# Patient Record
Sex: FEMALE | Race: WHITE | NOT HISPANIC OR LATINO
[De-identification: names, ages, dates, MRNs, and addresses within clinical notes are randomized per-mention and may not be internally consistent; named-entity substitution may affect disease eponyms.]

---

## 2017-11-08 ENCOUNTER — RESULT REVIEW (OUTPATIENT)
Age: 37
End: 2017-11-08

## 2017-12-26 ENCOUNTER — TRANSCRIPTION ENCOUNTER (OUTPATIENT)
Age: 37
End: 2017-12-26

## 2019-03-11 ENCOUNTER — RX RENEWAL (OUTPATIENT)
Age: 39
End: 2019-03-11

## 2019-03-25 ENCOUNTER — RECORD ABSTRACTING (OUTPATIENT)
Age: 39
End: 2019-03-25

## 2019-03-25 DIAGNOSIS — Z82.61 FAMILY HISTORY OF ARTHRITIS: ICD-10-CM

## 2019-03-25 DIAGNOSIS — Z82.49 FAMILY HISTORY OF ISCHEMIC HEART DISEASE AND OTHER DISEASES OF THE CIRCULATORY SYSTEM: ICD-10-CM

## 2019-03-25 DIAGNOSIS — Z82.0 FAMILY HISTORY OF EPILEPSY AND OTHER DISEASES OF THE NERVOUS SYSTEM: ICD-10-CM

## 2019-03-25 DIAGNOSIS — Z80.52 FAMILY HISTORY OF MALIGNANT NEOPLASM OF BLADDER: ICD-10-CM

## 2019-03-25 DIAGNOSIS — Z83.3 FAMILY HISTORY OF DIABETES MELLITUS: ICD-10-CM

## 2019-03-25 DIAGNOSIS — Z86.59 PERSONAL HISTORY OF OTHER MENTAL AND BEHAVIORAL DISORDERS: ICD-10-CM

## 2019-04-01 ENCOUNTER — RX RENEWAL (OUTPATIENT)
Age: 39
End: 2019-04-01

## 2019-04-04 RX ORDER — FLUOXETINE HYDROCHLORIDE 20 MG/1
20 CAPSULE ORAL
Qty: 90 | Refills: 3 | Status: ACTIVE | COMMUNITY
Start: 2019-03-11

## 2019-06-06 ENCOUNTER — APPOINTMENT (OUTPATIENT)
Dept: OBGYN | Facility: CLINIC | Age: 39
End: 2019-06-06
Payer: COMMERCIAL

## 2019-06-06 VITALS
SYSTOLIC BLOOD PRESSURE: 118 MMHG | HEIGHT: 65 IN | DIASTOLIC BLOOD PRESSURE: 60 MMHG | WEIGHT: 183 LBS | BODY MASS INDEX: 30.49 KG/M2

## 2019-06-06 DIAGNOSIS — N60.19 DIFFUSE CYSTIC MASTOPATHY OF UNSPECIFIED BREAST: ICD-10-CM

## 2019-06-06 PROCEDURE — 99395 PREV VISIT EST AGE 18-39: CPT

## 2019-06-06 PROCEDURE — 36415 COLL VENOUS BLD VENIPUNCTURE: CPT

## 2019-06-07 ENCOUNTER — TRANSCRIPTION ENCOUNTER (OUTPATIENT)
Age: 39
End: 2019-06-07

## 2019-06-12 LAB
C TRACH RRNA SPEC QL NAA+PROBE: NOT DETECTED
CYTOLOGY CVX/VAG DOC THIN PREP: NORMAL
HCV AB SER QL: NONREACTIVE
HCV S/CO RATIO: 0.14 S/CO
HIV1+2 AB SPEC QL IA.RAPID: NONREACTIVE
HPV HIGH+LOW RISK DNA PNL CVX: NOT DETECTED
N GONORRHOEA RRNA SPEC QL NAA+PROBE: NOT DETECTED
SOURCE AMPLIFICATION: NORMAL
T PALLIDUM AB SER QL IA: NEGATIVE

## 2019-07-15 ENCOUNTER — RESULT REVIEW (OUTPATIENT)
Age: 39
End: 2019-07-15

## 2020-04-26 ENCOUNTER — MESSAGE (OUTPATIENT)
Age: 40
End: 2020-04-26

## 2020-05-06 LAB
SARS-COV-2 IGG SERPL IA-ACNC: 3.7 AU/ML
SARS-COV-2 IGG SERPL QL IA: NEGATIVE

## 2020-08-10 ENCOUNTER — APPOINTMENT (OUTPATIENT)
Dept: OBGYN | Facility: CLINIC | Age: 40
End: 2020-08-10
Payer: COMMERCIAL

## 2020-08-10 VITALS — DIASTOLIC BLOOD PRESSURE: 74 MMHG | SYSTOLIC BLOOD PRESSURE: 120 MMHG | HEIGHT: 65 IN | TEMPERATURE: 98.7 F

## 2020-08-10 PROCEDURE — 99396 PREV VISIT EST AGE 40-64: CPT

## 2020-08-10 RX ORDER — FLUOXETINE HYDROCHLORIDE 20 MG/1
20 CAPSULE ORAL
Refills: 0 | Status: DISCONTINUED | COMMUNITY
End: 2020-08-10

## 2020-08-10 RX ORDER — MEDROXYPROGESTERONE ACETATE 150 MG/ML
INJECTION, SUSPENSION INTRAMUSCULAR
Refills: 0 | Status: DISCONTINUED | COMMUNITY
End: 2020-08-10

## 2020-08-10 RX ORDER — NORETHINDRONE ACETATE AND ETHINYL ESTRADIOL 1.5; .03 MG/1; MG/1
1.5-3 TABLET, FILM COATED ORAL
Refills: 0 | Status: DISCONTINUED | COMMUNITY
End: 2020-08-10

## 2020-08-10 RX ORDER — NORETHINDRONE ACETATE/ETHINYL ESTRADIOL 1.5-0.03MG
1.5-3 KIT ORAL
Refills: 0 | Status: DISCONTINUED | COMMUNITY
End: 2020-08-10

## 2020-08-21 ENCOUNTER — RESULT REVIEW (OUTPATIENT)
Age: 40
End: 2020-08-21

## 2021-08-26 ENCOUNTER — APPOINTMENT (OUTPATIENT)
Dept: OBGYN | Facility: CLINIC | Age: 41
End: 2021-08-26
Payer: COMMERCIAL

## 2021-08-26 ENCOUNTER — NON-APPOINTMENT (OUTPATIENT)
Age: 41
End: 2021-08-26

## 2021-08-26 VITALS
BODY MASS INDEX: 32.65 KG/M2 | SYSTOLIC BLOOD PRESSURE: 118 MMHG | HEIGHT: 65 IN | DIASTOLIC BLOOD PRESSURE: 76 MMHG | WEIGHT: 196 LBS

## 2021-08-26 PROCEDURE — 99396 PREV VISIT EST AGE 40-64: CPT

## 2021-08-26 NOTE — HISTORY OF PRESENT ILLNESS
[Mammogramdate] : 8/21/20 [TextBox_19] : BIRADS 1 [BreastSonogramDate] : 8/21/20 [TextBox_25] : BIRADS 1 [PapSmeardate] : 6/6/19 [TextBox_31] : Neg/HPV Neg [PGHxTotal] : 2 [[facility] Instit[facility]exFullTerm] : 2 [PGHxPremature] : 0 [PGHxAbortions] : 0 [[facility]Living] : 2 [FreeTextEntry1] :  Pregnancy 1:  2009, normal spontaneous vaginal delivery (), Female "Radha", Birth Weight:7lb 15oz.  Pregnancy 2:  2014, normal spontaneous vaginal delivery (), Male "Gagan", Birth Weight:9lbs 1oz

## 2021-09-29 ENCOUNTER — RESULT REVIEW (OUTPATIENT)
Age: 41
End: 2021-09-29

## 2022-01-27 ENCOUNTER — RESULT REVIEW (OUTPATIENT)
Age: 42
End: 2022-01-27

## 2022-09-13 ENCOUNTER — RESULT REVIEW (OUTPATIENT)
Age: 42
End: 2022-09-13

## 2023-01-10 ENCOUNTER — APPOINTMENT (OUTPATIENT)
Dept: OBGYN | Facility: CLINIC | Age: 43
End: 2023-01-10
Payer: COMMERCIAL

## 2023-01-10 VITALS
DIASTOLIC BLOOD PRESSURE: 80 MMHG | BODY MASS INDEX: 31.49 KG/M2 | HEIGHT: 65 IN | SYSTOLIC BLOOD PRESSURE: 110 MMHG | WEIGHT: 189 LBS

## 2023-01-10 DIAGNOSIS — Z12.4 ENCOUNTER FOR SCREENING FOR MALIGNANT NEOPLASM OF CERVIX: ICD-10-CM

## 2023-01-10 DIAGNOSIS — R92.2 INCONCLUSIVE MAMMOGRAM: ICD-10-CM

## 2023-01-10 DIAGNOSIS — Z00.00 ENCOUNTER FOR GENERAL ADULT MEDICAL EXAMINATION W/OUT ABNORMAL FINDINGS: ICD-10-CM

## 2023-01-10 DIAGNOSIS — Z11.3 ENCOUNTER FOR SCREENING FOR INFECTIONS WITH A PREDOMINANTLY SEXUAL MODE OF TRANSMISSION: ICD-10-CM

## 2023-01-10 DIAGNOSIS — Z92.89 PERSONAL HISTORY OF OTHER MEDICAL TREATMENT: ICD-10-CM

## 2023-01-10 DIAGNOSIS — Z11.51 ENCOUNTER FOR SCREENING FOR HUMAN PAPILLOMAVIRUS (HPV): ICD-10-CM

## 2023-01-10 DIAGNOSIS — R87.810 CERVICAL HIGH RISK HUMAN PAPILLOMAVIRUS (HPV) DNA TEST POSITIVE: ICD-10-CM

## 2023-01-10 PROCEDURE — 99396 PREV VISIT EST AGE 40-64: CPT

## 2023-01-10 RX ORDER — ALPRAZOLAM 2 MG/1
TABLET ORAL
Refills: 0 | Status: ACTIVE | COMMUNITY

## 2023-01-10 RX ORDER — MOMETASONE FUROATE MONOHYDRATE 50 UG/1
50 SPRAY, METERED NASAL
Refills: 0 | Status: DISCONTINUED | COMMUNITY
End: 2023-01-10

## 2023-01-10 NOTE — HISTORY OF PRESENT ILLNESS
[FreeTextEntry1] : 41 y/o  presents for annual GYN exam.\par \par  and sexually active without and issues. Pt is S/P TL.\par \par Periods are monthly and very light since endometrial ablation approximately 6-7 years ago.\par \par She reports overall good health. 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

## 2023-01-12 LAB — HPV HIGH+LOW RISK DNA PNL CVX: NOT DETECTED

## 2023-01-17 ENCOUNTER — TRANSCRIPTION ENCOUNTER (OUTPATIENT)
Age: 43
End: 2023-01-17

## 2023-01-17 LAB — CYTOLOGY CVX/VAG DOC THIN PREP: NORMAL

## 2023-03-08 ENCOUNTER — APPOINTMENT (OUTPATIENT)
Dept: PAIN MANAGEMENT | Facility: CLINIC | Age: 43
End: 2023-03-08
Payer: COMMERCIAL

## 2023-03-08 VITALS
SYSTOLIC BLOOD PRESSURE: 126 MMHG | BODY MASS INDEX: 31.49 KG/M2 | HEIGHT: 65 IN | TEMPERATURE: 98 F | WEIGHT: 189 LBS | DIASTOLIC BLOOD PRESSURE: 86 MMHG

## 2023-03-08 PROCEDURE — 99204 OFFICE O/P NEW MOD 45 MIN: CPT

## 2023-03-08 RX ORDER — BUPROPION HYDROCHLORIDE 100 MG/1
TABLET, FILM COATED ORAL
Refills: 0 | Status: ACTIVE | COMMUNITY

## 2023-03-08 NOTE — HISTORY OF PRESENT ILLNESS
[0 (No Pain)] : 1. What number best describes your pain on average in the past week? 0/10 pain [Neck Pain] : neck pain [___ yrs] : [unfilled] year(s) ago [Paroxysmal] : paroxysmal [1] : a current pain level of 1/10 [0] : a minimum pain level of 0/10 [10] : a maximum pain level of 10/10 [Sharp] : sharp [Dull] : dull [Aching] : aching [Burning] : burning [Shooting] : shooting [Lifting] : lifting [Heat] : heat [FreeTextEntry1] : HPI\par \par  \par \par Ms. VILMA RANDLE is a 42 year F with no significant pmhx present with 10 yr hx neck pain that radiates down the arms at times. Recent exacerbation of pain that radiated down the right arm with associated pin and needles to hands.  Denies weakness or fine motor difficulties. No relief with muscle relaxer and Tylenol. Pain has impacted ability to perform ADL's. Denies any additional weakness, numbness, bowel/bladder dysfunction.\par \par \par Previous and current pain medications/doses/effects: Flexeril, Tylenol \par \par  \par \par Previous Pain Treatments: PT \par \par  \par \par Previous Pain Injections: None\par \par  \par \par Previous Diagnostic Studies/Images: None\par \par  \par \par \par  [FreeTextEntry2] : 2 [FreeTextEntry7] : Neck pain , radiates to right arm [FreeTextEntry3] : any arm movement  [FreeTextEntry4] : chiropractor, tylenol

## 2023-03-08 NOTE — ASSESSMENT
[FreeTextEntry1] : >> Imaging and Other Studies\par \par Neck and arm pain likely secondary to cervical radiculopathy and discogenic pain vs spondylosis refractory to conservative treatments including home exercises, will obtain MRI CS to evaluate for pathology\par \par may consider PT vs intervention pending eval\par \par >> Therapy and Other Modalities\par \par consider PT\par \par >> Medications\par \par acetaminophen 650mg q8h prn pain (caution <3g daily)\par  \par >> Interventions\par \par na\par \par >> Consults\par \par na\par \par >> Discussion of Risks/Benefits/Alternatives\par \par 	>Regarding any scheduled procedures:\par \par I have discussed in detail with the patient that any interventional pain procedure is associated with potential risks.  The procedure may include an injection of steroids and potentially other medications (local anesthetic and normal saline) into the epidural space or surrounding tissue of the spine.  There are significant risks of this procedure which include and are not limited to infection, bleeding, worsening pain, dural puncture leading to postdural puncture headache, nerve damage, spinal cord injury, paralysis, stroke, and death.  \par \par There is a chance that the procedure does not improve their pain.  \par \par There are risks associated with the steroid being absorbed into the body systemically.  These include dysphoria, difficulty sleeping, mood swings and personality changes.  Premenopausal women may notice an irregularity in her menstrual cycle for 2-3 months following the injection.  Steroids can specifically affect patients with hypertension, diabetes, and peptic ulcers.  The procedure may cause a temporary increase in blood pressure and blood pressure, and may adversely affect a peptic ulcer.  Other, more rare complications, include avascular necrosis of joints, glaucoma and worsening of osteoporosis. \par \par I have discussed the risks of the procedure at length with the patient, and the potential benefits of pain relief.  I have offered alternatives to the procedure.  All questions were answered.  \par \par The patient expressed understanding and wishes to proceed with the procedure.\par \par 	>Regarding COVID19 Pandemic: \par \par Any planned interventional pain procedure are scheduled because further delay may cause harm or negative outcome to patient.  The goal in performing this procedure is to avoid deterioration of function, emergency room visits (which increases exposure) and reliance on opioids.  \par \par r/b/a discussed with patient, lack of evidence to conclusively determine whether pain management procedures have any positive or negative impact on the possibility of hima the virus and/or development of any sequelae. \par \par Patient counselled regarding timing steroid based intervention 2 weeks before or after COVID-19 vaccine administration to avoid any interaction or affect on efficacy of vaccination\par \par Patient demonstrates understanding\par \par Informed patient that risks associated with the COVID-19 infection.  Informed patient steps taken to limit the risks.  We are implementing safety precautions and following protocols consistent with the CDC and state recommendations. All patients and staff will be checked for fever or signs of illness upon entry to the facility. We will limit our steroid dose to the lowest effective therapeutic dose or in some cases steroids will not be injected at all. \par \par Patient agrees to proceed\par \par >> Conclusion\par \par The above diagnosis and treatment plan is medically reasonable and necessary based on the patient encounter \par There were no barriers to communication.\par Informed patient that I would be available for any additional questions.\par Patient was instructed to call with any worsening symptoms including severe pain, new numbness/weakness, or changes in the bowel/bladder function. \par Discussed role of nsaids in pain management and all relevant risks, if patient is continuing to require after 4 weeks the patient should f/u for alternative treatment. \par Instructed patient to maintain pain diary to monitor pain level, mobility, and function.\par \par The referring provider was informed of the above diagnosis and treatment plan.\par

## 2023-03-08 NOTE — REVIEW OF SYSTEMS
[Neck Pain] : neck pain [Radiating Pain] : radiating pain [Numbness] : numbness [Negative] : Heme/Lymph

## 2023-03-08 NOTE — PHYSICAL EXAM
[de-identified] : Constitutional: Normal, well developed, no acute distress\par Eyes: Symmetric, External structures \par Oropharynx: Lips normal, symmetric, no external lesions appreciated\par Respiratory: Non-labored breathing, no audible wheezes\par Cardiac: Pulse palpated, no tachycardia\par Vascular: No cyanosis appreciated, no edema in bilateral lower extremities\par GI: Nondistended, no jaundice appreciated\par Neurovascular: CN2-12 grossly intact, Alert and oriented\par MSK: Normal muscle bulk, 5/5 Motor strength B/L in LE\par \par

## 2023-03-17 ENCOUNTER — RESULT REVIEW (OUTPATIENT)
Age: 43
End: 2023-03-17

## 2023-03-22 ENCOUNTER — APPOINTMENT (OUTPATIENT)
Dept: PAIN MANAGEMENT | Facility: CLINIC | Age: 43
End: 2023-03-22
Payer: COMMERCIAL

## 2023-03-22 VITALS
SYSTOLIC BLOOD PRESSURE: 125 MMHG | HEIGHT: 65 IN | BODY MASS INDEX: 31.49 KG/M2 | WEIGHT: 189 LBS | DIASTOLIC BLOOD PRESSURE: 89 MMHG | TEMPERATURE: 98 F

## 2023-03-22 PROCEDURE — 99214 OFFICE O/P EST MOD 30 MIN: CPT

## 2023-03-22 NOTE — ASSESSMENT
[FreeTextEntry1] : >> Imaging and Other Studies\par \par I personally reviewed the relevant imaging.  Discussed and explained to patient the likely source of pathology and pain.  Questions answered. MRI\par \par >> Therapy and Other Modalities\par \par consider PT\par \par >> Medications\par \par acetaminophen 650mg q8h prn pain (caution <3g daily)\par  \par >> Interventions\par \par \par MRI demonstrating disc herniation causing radiculopathy, significantly impactful to ability to perform ADL and refractory to conservative treatments, including physician directed exercises/PT.  Will schedule C7-T1 interlaminar epidural steroid injection r/b/a discussed\par \par >> Consults\par \par na\par \par >> Discussion of Risks/Benefits/Alternatives\par \par 	>Regarding any scheduled procedures:\par \par I have discussed in detail with the patient that any interventional pain procedure is associated with potential risks.  The procedure may include an injection of steroids and potentially other medications (local anesthetic and normal saline) into the epidural space or surrounding tissue of the spine.  There are significant risks of this procedure which include and are not limited to infection, bleeding, worsening pain, dural puncture leading to postdural puncture headache, nerve damage, spinal cord injury, paralysis, stroke, and death.  \par \par There is a chance that the procedure does not improve their pain.  \par \par There are risks associated with the steroid being absorbed into the body systemically.  These include dysphoria, difficulty sleeping, mood swings and personality changes.  Premenopausal women may notice an irregularity in her menstrual cycle for 2-3 months following the injection.  Steroids can specifically affect patients with hypertension, diabetes, and peptic ulcers.  The procedure may cause a temporary increase in blood pressure and blood pressure, and may adversely affect a peptic ulcer.  Other, more rare complications, include avascular necrosis of joints, glaucoma and worsening of osteoporosis. \par \par I have discussed the risks of the procedure at length with the patient, and the potential benefits of pain relief.  I have offered alternatives to the procedure.  All questions were answered.  \par \par The patient expressed understanding and wishes to proceed with the procedure.\par \par 	>Regarding COVID19 Pandemic: \par \par Any planned interventional pain procedure are scheduled because further delay may cause harm or negative outcome to patient.  The goal in performing this procedure is to avoid deterioration of function, emergency room visits (which increases exposure) and reliance on opioids.  \par \par r/b/a discussed with patient, lack of evidence to conclusively determine whether pain management procedures have any positive or negative impact on the possibility of hima the virus and/or development of any sequelae. \par \par Patient counselled regarding timing steroid based intervention 2 weeks before or after COVID-19 vaccine administration to avoid any interaction or affect on efficacy of vaccination\par \par Patient demonstrates understanding\par \par Informed patient that risks associated with the COVID-19 infection.  Informed patient steps taken to limit the risks.  We are implementing safety precautions and following protocols consistent with the CDC and state recommendations. All patients and staff will be checked for fever or signs of illness upon entry to the facility. We will limit our steroid dose to the lowest effective therapeutic dose or in some cases steroids will not be injected at all. \par \par Patient agrees to proceed\par \par >> Conclusion\par \par The above diagnosis and treatment plan is medically reasonable and necessary based on the patient encounter \par There were no barriers to communication.\par Informed patient that I would be available for any additional questions.\par Patient was instructed to call with any worsening symptoms including severe pain, new numbness/weakness, or changes in the bowel/bladder function. \par Discussed role of nsaids in pain management and all relevant risks, if patient is continuing to require after 4 weeks the patient should f/u for alternative treatment. \par Instructed patient to maintain pain diary to monitor pain level, mobility, and function.\par \par

## 2023-03-22 NOTE — HISTORY OF PRESENT ILLNESS
[5] : 3. What number best describes how, during the past week, pain has interfered with your general activity? 5/10 pain [Neck Pain] : neck pain [___ yrs] : [unfilled] year(s) ago [Paroxysmal] : paroxysmal [1] : a current pain level of 1/10 [0] : a minimum pain level of 0/10 [10] : a maximum pain level of 10/10 [Sharp] : sharp [Dull] : dull [Aching] : aching [Burning] : burning [Shooting] : shooting [Lifting] : lifting [Heat] : heat [FreeTextEntry1] : Interval Note:\par \par Since last visit the pain is not improved.  Continues to have pain over neck radiating to right shoulder arm.  Pain is so bad that patient finds it difficult to perform adls.  Pain reaching for objects. Denies any additional weakness, numbness, bowel/bladder dysfunction.  \par \par \par \par HPI\par \par  \par \par Ms. VILMA RANDLE is a 42 year F with no significant pmhx present with 10 yr hx neck pain that radiates down the arms at times. Recent exacerbation of pain that radiated down the right arm with associated pin and needles to hands.  Denies weakness or fine motor difficulties. No relief with muscle relaxer and Tylenol. Pain has impacted ability to perform ADL's. Denies any additional weakness, numbness, bowel/bladder dysfunction.\par \par \par Previous and current pain medications/doses/effects: Flexeril, Tylenol \par \par  \par \par Previous Pain Treatments: PT \par \par  \par \par Previous Pain Injections: None\par \par  \par \par Previous Diagnostic Studies/Images: \par \par MRI 3/23\par \par There is normal cervical lordosis. No subluxation. Vertebral body heights are maintained. Bone\par marrow signal is unremarkable. No prevertebral soft tissue swelling.\par \par  The cervical cord is normal in size and signal characteristics. Visualized portion of the posterior\par fossa is unremarkable.\par \par  C2-C3 level: No disc herniation. No central canal or foraminal narrowing.\par \par \par  C3-C4 level: Broad-based posterior disc bulge with superimposed central protrusion results in\par moderate central canal narrowing but no significant right or left neural foraminal narrowing.\par \par  C4-C5 level:  Small broad-based posterior disc bulge and mild bilateral facet arthropathy result in\par mild bilateral neural foraminal narrowing but no significant central canal narrowing.\par \par  C5-C6 level: Small broad-based posterior disc bulge and mild bilateral facet arthropathy result in\par mild bilateral neural foraminal narrowing but no significant central canal narrowing.\par \par  C6-C7 level: Broad-based posterior disc bulge and mild bilateral facet arthropathy result in mild\par bilateral neural foraminal narrowing but no significant central canal narrowing.\par \par  C7-T1 level: No disc herniation. No central canal or foraminal narrowing.\par \par \par  In the visualized upper thoracic spine there is no disc herniation or central canal narrowing.\par \par \par \par  IMPRESSION:\par \par  Multilevel discogenic degenerative disease and facet arthropathy of the cervical spine, most\par pronounced at C3-C4 where there is moderate central canal narrowing. Additional varying degrees of\par central canal and neural foraminal narrowing, as above.\par \par \par \par  \par \par \par  [FreeTextEntry2] : 15 [FreeTextEntry7] : Neck pain , radiates to right arm [FreeTextEntry3] : any arm movement  [FreeTextEntry4] : chiropractor, tylenol

## 2023-03-24 ENCOUNTER — TRANSCRIPTION ENCOUNTER (OUTPATIENT)
Age: 43
End: 2023-03-24

## 2023-03-24 ENCOUNTER — RESULT REVIEW (OUTPATIENT)
Age: 43
End: 2023-03-24

## 2023-03-24 ENCOUNTER — APPOINTMENT (OUTPATIENT)
Dept: PAIN MANAGEMENT | Facility: HOSPITAL | Age: 43
End: 2023-03-24

## 2023-04-05 ENCOUNTER — APPOINTMENT (OUTPATIENT)
Dept: PAIN MANAGEMENT | Facility: CLINIC | Age: 43
End: 2023-04-05
Payer: COMMERCIAL

## 2023-04-05 VITALS
HEIGHT: 65 IN | DIASTOLIC BLOOD PRESSURE: 85 MMHG | BODY MASS INDEX: 31.49 KG/M2 | WEIGHT: 189 LBS | SYSTOLIC BLOOD PRESSURE: 122 MMHG

## 2023-04-05 PROCEDURE — 99214 OFFICE O/P EST MOD 30 MIN: CPT

## 2023-04-05 NOTE — HISTORY OF PRESENT ILLNESS
[0 (No Pain)] : 1. What number best describes your pain on average in the past week? 0/10 pain [0 (Does not interfere)] : 3. What number best describes how, during the past week, pain has interfered with your general activity? 0/10 pain [Neck Pain] : neck pain [___ yrs] : [unfilled] year(s) ago [Paroxysmal] : paroxysmal [1] : a current pain level of 1/10 [0] : a minimum pain level of 0/10 [10] : a maximum pain level of 10/10 [Sharp] : sharp [Dull] : dull [Aching] : aching [Burning] : burning [Shooting] : shooting [Lifting] : lifting [Heat] : heat [FreeTextEntry1] : Interval Note:\par sp  C7-T1 interlaminar epidural steroid injection 3/24/23 with 100% improvement in neck and arm pain. \par Able to perform all adls without discomfort. Denies any additional weakness, numbness, bowel/bladder dysfunction.  \par \par \par \par HPI\par \par  \par \par Ms. VILMA RANDLE is a 42 year F with no significant pmhx present with 10 yr hx neck pain that radiates down the arms at times. Recent exacerbation of pain that radiated down the right arm with associated pin and needles to hands.  Denies weakness or fine motor difficulties. No relief with muscle relaxer and Tylenol. Pain has impacted ability to perform ADL's. Denies any additional weakness, numbness, bowel/bladder dysfunction.\par \par \par Previous and current pain medications/doses/effects: Flexeril, Tylenol \par \par  \par \par Previous Pain Treatments: PT \par \par  \par \par Previous Pain Injections: \par \par  C7-T1 interlaminar epidural steroid injection 3/24/23\par \par  \par \par Previous Diagnostic Studies/Images: \par \par MRI 3/23\par \par There is normal cervical lordosis. No subluxation. Vertebral body heights are maintained. Bone\par marrow signal is unremarkable. No prevertebral soft tissue swelling.\par \par  The cervical cord is normal in size and signal characteristics. Visualized portion of the posterior\par fossa is unremarkable.\par \par  C2-C3 level: No disc herniation. No central canal or foraminal narrowing.\par \par \par  C3-C4 level: Broad-based posterior disc bulge with superimposed central protrusion results in\par moderate central canal narrowing but no significant right or left neural foraminal narrowing.\par \par  C4-C5 level:  Small broad-based posterior disc bulge and mild bilateral facet arthropathy result in\par mild bilateral neural foraminal narrowing but no significant central canal narrowing.\par \par  C5-C6 level: Small broad-based posterior disc bulge and mild bilateral facet arthropathy result in\par mild bilateral neural foraminal narrowing but no significant central canal narrowing.\par \par  C6-C7 level: Broad-based posterior disc bulge and mild bilateral facet arthropathy result in mild\par bilateral neural foraminal narrowing but no significant central canal narrowing.\par \par  C7-T1 level: No disc herniation. No central canal or foraminal narrowing.\par \par \par  In the visualized upper thoracic spine there is no disc herniation or central canal narrowing.\par \par \par \par  IMPRESSION:\par \par  Multilevel discogenic degenerative disease and facet arthropathy of the cervical spine, most\par pronounced at C3-C4 where there is moderate central canal narrowing. Additional varying degrees of\par central canal and neural foraminal narrowing, as above.\par \par \par \par  \par \par \par  [FreeTextEntry2] : 0 [FreeTextEntry7] : Neck pain , radiates to right arm [FreeTextEntry3] : any arm movement  [FreeTextEntry4] : chiropractor, tylenol

## 2023-04-05 NOTE — ASSESSMENT
[FreeTextEntry1] : >> Imaging and Other Studies\par \par I personally reviewed the relevant imaging.  Discussed and explained to patient the likely source of pathology and pain.  Questions answered. MRI\par \par >> Therapy and Other Modalities\par \par start PT\par \par >> Medications\par \par acetaminophen 650mg q8h prn pain (caution <3g daily)\par  \par >> Interventions\par \par \par MRI demonstrating disc herniation causing radiculopathy, significantly impactful to ability to perform ADL and refractory to conservative treatments, including physician directed exercises/PT. sp C7-T1 interlaminar epidural steroid injection r/b/a discussed\par \par >> Consults\par \par na\par \par >> Discussion of Risks/Benefits/Alternatives\par \par 	>Regarding any scheduled procedures:\par \par I have discussed in detail with the patient that any interventional pain procedure is associated with potential risks.  The procedure may include an injection of steroids and potentially other medications (local anesthetic and normal saline) into the epidural space or surrounding tissue of the spine.  There are significant risks of this procedure which include and are not limited to infection, bleeding, worsening pain, dural puncture leading to postdural puncture headache, nerve damage, spinal cord injury, paralysis, stroke, and death.  \par \par There is a chance that the procedure does not improve their pain.  \par \par There are risks associated with the steroid being absorbed into the body systemically.  These include dysphoria, difficulty sleeping, mood swings and personality changes.  Premenopausal women may notice an irregularity in her menstrual cycle for 2-3 months following the injection.  Steroids can specifically affect patients with hypertension, diabetes, and peptic ulcers.  The procedure may cause a temporary increase in blood pressure and blood pressure, and may adversely affect a peptic ulcer.  Other, more rare complications, include avascular necrosis of joints, glaucoma and worsening of osteoporosis. \par \par I have discussed the risks of the procedure at length with the patient, and the potential benefits of pain relief.  I have offered alternatives to the procedure.  All questions were answered.  \par \par The patient expressed understanding and wishes to proceed with the procedure.\par \par 	>Regarding COVID19 Pandemic: \par \par Any planned interventional pain procedure are scheduled because further delay may cause harm or negative outcome to patient.  The goal in performing this procedure is to avoid deterioration of function, emergency room visits (which increases exposure) and reliance on opioids.  \par \par r/b/a discussed with patient, lack of evidence to conclusively determine whether pain management procedures have any positive or negative impact on the possibility of hima the virus and/or development of any sequelae. \par \par Patient counselled regarding timing steroid based intervention 2 weeks before or after COVID-19 vaccine administration to avoid any interaction or affect on efficacy of vaccination\par \par Patient demonstrates understanding\par \par Informed patient that risks associated with the COVID-19 infection.  Informed patient steps taken to limit the risks.  We are implementing safety precautions and following protocols consistent with the CDC and state recommendations. All patients and staff will be checked for fever or signs of illness upon entry to the facility. We will limit our steroid dose to the lowest effective therapeutic dose or in some cases steroids will not be injected at all. \par \par Patient agrees to proceed\par \par >> Conclusion\par \par The above diagnosis and treatment plan is medically reasonable and necessary based on the patient encounter \par There were no barriers to communication.\par Informed patient that I would be available for any additional questions.\par Patient was instructed to call with any worsening symptoms including severe pain, new numbness/weakness, or changes in the bowel/bladder function. \par Discussed role of nsaids in pain management and all relevant risks, if patient is continuing to require after 4 weeks the patient should f/u for alternative treatment. \par Instructed patient to maintain pain diary to monitor pain level, mobility, and function.\par \par

## 2023-05-03 ENCOUNTER — APPOINTMENT (OUTPATIENT)
Dept: PAIN MANAGEMENT | Facility: CLINIC | Age: 43
End: 2023-05-03

## 2023-07-25 DIAGNOSIS — R92.2 INCONCLUSIVE MAMMOGRAM: ICD-10-CM

## 2023-07-25 DIAGNOSIS — Z12.31 ENCOUNTER FOR SCREENING MAMMOGRAM FOR MALIGNANT NEOPLASM OF BREAST: ICD-10-CM

## 2023-09-14 ENCOUNTER — RESULT REVIEW (OUTPATIENT)
Age: 43
End: 2023-09-14

## 2023-10-04 ENCOUNTER — NON-APPOINTMENT (OUTPATIENT)
Age: 43
End: 2023-10-04

## 2023-11-21 ENCOUNTER — APPOINTMENT (OUTPATIENT)
Dept: PAIN MANAGEMENT | Facility: CLINIC | Age: 43
End: 2023-11-21
Payer: COMMERCIAL

## 2023-11-21 VITALS
WEIGHT: 189 LBS | HEART RATE: 84 BPM | DIASTOLIC BLOOD PRESSURE: 78 MMHG | SYSTOLIC BLOOD PRESSURE: 117 MMHG | HEIGHT: 65 IN | RESPIRATION RATE: 16 BRPM | BODY MASS INDEX: 31.49 KG/M2 | OXYGEN SATURATION: 99 %

## 2023-11-21 VITALS
WEIGHT: 189 LBS | DIASTOLIC BLOOD PRESSURE: 75 MMHG | BODY MASS INDEX: 31.49 KG/M2 | HEIGHT: 65 IN | SYSTOLIC BLOOD PRESSURE: 110 MMHG

## 2023-11-21 PROCEDURE — 99214 OFFICE O/P EST MOD 30 MIN: CPT

## 2023-11-22 RX ORDER — GABAPENTIN 300 MG/1
300 CAPSULE ORAL
Qty: 30 | Refills: 1 | Status: ACTIVE | COMMUNITY
Start: 2023-11-21 | End: 1900-01-01

## 2023-11-30 ENCOUNTER — TRANSCRIPTION ENCOUNTER (OUTPATIENT)
Age: 43
End: 2023-11-30

## 2023-11-30 ENCOUNTER — APPOINTMENT (OUTPATIENT)
Dept: PAIN MANAGEMENT | Facility: HOSPITAL | Age: 43
End: 2023-11-30

## 2023-11-30 ENCOUNTER — RESULT REVIEW (OUTPATIENT)
Age: 43
End: 2023-11-30

## 2023-11-30 RX ORDER — TIZANIDINE 2 MG/1
2 TABLET ORAL
Qty: 45 | Refills: 0 | Status: ACTIVE | COMMUNITY
Start: 2023-11-30 | End: 1900-01-01

## 2023-12-15 ENCOUNTER — APPOINTMENT (OUTPATIENT)
Dept: PAIN MANAGEMENT | Facility: CLINIC | Age: 43
End: 2023-12-15
Payer: COMMERCIAL

## 2023-12-15 VITALS
BODY MASS INDEX: 31.49 KG/M2 | WEIGHT: 189 LBS | HEIGHT: 65 IN | DIASTOLIC BLOOD PRESSURE: 81 MMHG | SYSTOLIC BLOOD PRESSURE: 122 MMHG

## 2023-12-15 DIAGNOSIS — M48.02 SPINAL STENOSIS, CERVICAL REGION: ICD-10-CM

## 2023-12-15 DIAGNOSIS — G89.4 CHRONIC PAIN SYNDROME: ICD-10-CM

## 2023-12-15 DIAGNOSIS — M79.18 MYALGIA, OTHER SITE: ICD-10-CM

## 2023-12-15 DIAGNOSIS — M54.12 RADICULOPATHY, CERVICAL REGION: ICD-10-CM

## 2023-12-15 DIAGNOSIS — M79.2 NEURALGIA AND NEURITIS, UNSPECIFIED: ICD-10-CM

## 2023-12-15 PROCEDURE — 99214 OFFICE O/P EST MOD 30 MIN: CPT

## 2023-12-15 RX ORDER — GABAPENTIN 300 MG/1
300 CAPSULE ORAL
Qty: 30 | Refills: 1 | Status: ACTIVE | COMMUNITY
Start: 2023-12-15

## 2023-12-15 NOTE — PHYSICAL EXAM
[Normal] : Well developed, in no acute distress, alert and oriented to person, place and time [Normal muscle bulk without asymmetry] : normal muscle bulk without asymmetry [Facet Tenderness] : no facet tenderness [Taylor] : negative Taylor Test [Neer's] : negative Neer's Test [] : Motor:

## 2023-12-15 NOTE — HISTORY OF PRESENT ILLNESS
[Neck Pain] : neck pain [___ yrs] : [unfilled] year(s) ago [Paroxysmal] : paroxysmal [1] : a current pain level of 1/10 [0] : a minimum pain level of 0/10 [10] : a maximum pain level of 10/10 [Sharp] : sharp [Dull] : dull [Aching] : aching [Burning] : burning [Shooting] : shooting [Lifting] : lifting [Heat] : heat [0 (No Pain)] : 1. What number best describes your pain on average in the past week? 0/10 pain [0 (Does not interfere)] : 3. What number best describes how, during the past week, pain has interfered with your general activity? 0/10 pain [FreeTextEntry1] : Interval Note: sp C7-T1 interlaminar epidural steroid injection with improvement in pain.  But reports mild discomfort over right neck, fingers.   Denies any additional weakness, numbness, bowel/bladder dysfunction.      HPI     Ms. VILMA RANDLE is a 43 year F with no significant pmhx present with 10 yr hx neck pain that radiates down the arms at times. Recent exacerbation of pain that radiated down the right arm with associated pin and needles to hands.  Denies weakness or fine motor difficulties. No relief with muscle relaxer and Tylenol. Pain has impacted ability to perform ADL's. Denies any additional weakness, numbness, bowel/bladder dysfunction.   Previous and current pain medications/doses/effects: Flexeril, Tylenol      Previous Pain Treatments: PT      Previous Pain Injections:    C7-T1 interlaminar epidural steroid injection 3/24/23     Previous Diagnostic Studies/Images:   MRI 3/23  There is normal cervical lordosis. No subluxation. Vertebral body heights are maintained. Bone marrow signal is unremarkable. No prevertebral soft tissue swelling.   The cervical cord is normal in size and signal characteristics. Visualized portion of the posterior fossa is unremarkable.   C2-C3 level: No disc herniation. No central canal or foraminal narrowing.    C3-C4 level: Broad-based posterior disc bulge with superimposed central protrusion results in moderate central canal narrowing but no significant right or left neural foraminal narrowing.   C4-C5 level:  Small broad-based posterior disc bulge and mild bilateral facet arthropathy result in mild bilateral neural foraminal narrowing but no significant central canal narrowing.   C5-C6 level: Small broad-based posterior disc bulge and mild bilateral facet arthropathy result in mild bilateral neural foraminal narrowing but no significant central canal narrowing.   C6-C7 level: Broad-based posterior disc bulge and mild bilateral facet arthropathy result in mild bilateral neural foraminal narrowing but no significant central canal narrowing.   C7-T1 level: No disc herniation. No central canal or foraminal narrowing.    In the visualized upper thoracic spine there is no disc herniation or central canal narrowing.     IMPRESSION:   Multilevel discogenic degenerative disease and facet arthropathy of the cervical spine, most pronounced at C3-C4 where there is moderate central canal narrowing. Additional varying degrees of central canal and neural foraminal narrowing, as above.         [FreeTextEntry7] : Neck pain , radiates to right arm [FreeTextEntry3] : any arm movement  [FreeTextEntry4] : chiropractor, tylenol [FreeTextEntry2] : 0

## 2023-12-15 NOTE — ASSESSMENT
[FreeTextEntry1] : >> Imaging and Other Studies    I personally reviewed the relevant imaging. Discussed and explained to patient the likely source of pathology and pain. Questions answered. MRI    >> Therapy and Other Modalities    start PT     >> Medications  restart gabapentin nightly  acetaminophen 650mg q8h prn pain (caution <3g daily)    >> Interventions      MRI demonstrating disc herniation causing radiculopathy, significantly impactful to ability to perform ADL and refractory to conservative treatments, including physician directed exercises/PT. sp C7-T1 interlaminar epidural steroid injection  x 2    >> Consults    may consider spine evaluation - discussed with patient to see Dr. You    >> Discussion of Risks/Benefits/Alternatives     >Regarding any scheduled procedures:    I have discussed in detail with the patient that any interventional pain procedure is associated with potential risks. The procedure may include an injection of steroids and potentially other medications (local anesthetic and normal saline) into the epidural space or surrounding tissue of the spine. There are significant risks of this procedure which include and are not limited to infection, bleeding, worsening pain, dural puncture leading to postdural puncture headache, nerve damage, spinal cord injury, paralysis, stroke, and death.    There is a chance that the procedure does not improve their pain.    There are risks associated with the steroid being absorbed into the body systemically. These include dysphoria, difficulty sleeping, mood swings and personality changes. Premenopausal women may notice an irregularity in her menstrual cycle for 2-3 months following the injection. Steroids can specifically affect patients with hypertension, diabetes, and peptic ulcers. The procedure may cause a temporary increase in blood pressure and blood pressure, and may adversely affect a peptic ulcer. Other, more rare complications, include avascular necrosis of joints, glaucoma and worsening of osteoporosis.    I have discussed the risks of the procedure at length with the patient, and the potential benefits of pain relief. I have offered alternatives to the procedure. All questions were answered.    The patient expressed understanding and wishes to proceed with the procedure.     >Regarding COVID19 Pandemic:    Any planned interventional pain procedure are scheduled because further delay may cause harm or negative outcome to patient. The goal in performing this procedure is to avoid deterioration of function, emergency room visits (which increases exposure) and reliance on opioids.    r/b/a discussed with patient, lack of evidence to conclusively determine whether pain management procedures have any positive or negative impact on the possibility of hima the virus and/or development of any sequelae.    Patient counselled regarding timing steroid based intervention 2 weeks before or after COVID-19 vaccine administration to avoid any interaction or affect on efficacy of vaccination    Patient demonstrates understanding    Informed patient that risks associated with the COVID-19 infection. Informed patient steps taken to limit the risks. We are implementing safety precautions and following protocols consistent with the CDC and state recommendations. All patients and staff will be checked for fever or signs of illness upon entry to the facility. We will limit our steroid dose to the lowest effective therapeutic dose or in some cases steroids will not be injected at all.    Patient agrees to proceed    >> Conclusion    The above diagnosis and treatment plan is medically reasonable and necessary based on the patient encounter  There were no barriers to communication.  Informed patient that I would be available for any additional questions.  Patient was instructed to call with any worsening symptoms including severe pain, new numbness/weakness, or changes in the bowel/bladder function.  Discussed role of nsaids in pain management and all relevant risks, if patient is continuing to require after 4 weeks the patient should f/u for alternative treatment.  Instructed patient to maintain pain diary to monitor pain level, mobility, and function.

## 2024-01-12 ENCOUNTER — MED ADMIN CHARGE (OUTPATIENT)
Age: 44
End: 2024-01-12

## 2024-01-12 ENCOUNTER — APPOINTMENT (OUTPATIENT)
Dept: OBGYN | Facility: CLINIC | Age: 44
End: 2024-01-12
Payer: COMMERCIAL

## 2024-01-12 VITALS
SYSTOLIC BLOOD PRESSURE: 100 MMHG | DIASTOLIC BLOOD PRESSURE: 70 MMHG | HEIGHT: 65 IN | BODY MASS INDEX: 24.32 KG/M2 | WEIGHT: 146 LBS

## 2024-01-12 DIAGNOSIS — Z01.419 ENCOUNTER FOR GYNECOLOGICAL EXAMINATION (GENERAL) (ROUTINE) W/OUT ABNORMAL FINDINGS: ICD-10-CM

## 2024-01-12 PROCEDURE — 99396 PREV VISIT EST AGE 40-64: CPT

## 2024-01-12 NOTE — PHYSICAL EXAM
[Chaperone Declined] : Patient declined chaperone [Appropriately responsive] : appropriately responsive [Alert] : alert [No Acute Distress] : no acute distress [No Lymphadenopathy] : no lymphadenopathy [Regular Rate Rhythm] : regular rate rhythm [No Murmurs] : no murmurs [Clear to Auscultation B/L] : clear to auscultation bilaterally [Soft] : soft [Non-tender] : non-tender [Non-distended] : non-distended [No HSM] : No HSM [No Mass] : no mass [Oriented x3] : oriented x3 [Examination Of The Breasts] : a normal appearance [No Discharge] : no discharge [No Masses] : no breast masses were palpable [No Lesions] : no lesions  [Labia Majora] : normal [Labia Minora] : normal [Pink Rugae] : pink rugae [Normal] : normal [Uterine Adnexae] : normal [Scant] : There was scant vaginal bleeding [Tenderness] : nontender [Enlarged ___ wks] : not enlarged [FreeTextEntry5] : no CMT

## 2024-01-12 NOTE — HISTORY OF PRESENT ILLNESS
[FreeTextEntry1] : 44 y/o  presents for annual GYN exam.   and sexually active without and issues. Pt is S/P TL.  Periods are monthly and very light since endometrial ablation approximately 7-8 years ago.  She reports overall good health. No changes and no GYN complaints.  Pap 1/10/23- NILM/HPV-  Mammogram/ultrasound 23- BI-RADS 1  Denies FH of ca of ovary, colon, breast or uterus.  Lives with  and 2 children, (daughter 14, son 9).  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

## 2024-03-22 ENCOUNTER — APPOINTMENT (OUTPATIENT)
Dept: OBGYN | Facility: CLINIC | Age: 44
End: 2024-03-22
Payer: COMMERCIAL

## 2024-03-22 PROCEDURE — 90651 9VHPV VACCINE 2/3 DOSE IM: CPT

## 2024-03-22 PROCEDURE — 90471 IMMUNIZATION ADMIN: CPT

## 2024-07-24 ENCOUNTER — APPOINTMENT (OUTPATIENT)
Dept: OBGYN | Facility: CLINIC | Age: 44
End: 2024-07-24
Payer: COMMERCIAL

## 2024-07-24 PROCEDURE — 90471 IMMUNIZATION ADMIN: CPT

## 2024-07-24 PROCEDURE — 90651 9VHPV VACCINE 2/3 DOSE IM: CPT

## 2024-08-14 ENCOUNTER — NON-APPOINTMENT (OUTPATIENT)
Age: 44
End: 2024-08-14

## 2024-08-14 ENCOUNTER — TRANSCRIPTION ENCOUNTER (OUTPATIENT)
Age: 44
End: 2024-08-14

## 2024-08-14 ENCOUNTER — APPOINTMENT (OUTPATIENT)
Dept: INTERNAL MEDICINE | Facility: CLINIC | Age: 44
End: 2024-08-14

## 2024-08-15 ENCOUNTER — TRANSCRIPTION ENCOUNTER (OUTPATIENT)
Age: 44
End: 2024-08-15

## 2024-10-02 ENCOUNTER — RESULT REVIEW (OUTPATIENT)
Age: 44
End: 2024-10-02

## 2024-12-05 ENCOUNTER — APPOINTMENT (OUTPATIENT)
Dept: PSYCHIATRY | Facility: CLINIC | Age: 44
End: 2024-12-05
Payer: COMMERCIAL

## 2024-12-05 PROCEDURE — 90791 PSYCH DIAGNOSTIC EVALUATION: CPT | Mod: 95

## 2025-01-29 ENCOUNTER — TRANSCRIPTION ENCOUNTER (OUTPATIENT)
Age: 45
End: 2025-01-29

## 2025-03-24 ENCOUNTER — APPOINTMENT (OUTPATIENT)
Dept: GASTROENTEROLOGY | Facility: CLINIC | Age: 45
End: 2025-03-24
Payer: COMMERCIAL

## 2025-03-24 VITALS
HEIGHT: 64 IN | BODY MASS INDEX: 23.9 KG/M2 | SYSTOLIC BLOOD PRESSURE: 110 MMHG | WEIGHT: 140 LBS | DIASTOLIC BLOOD PRESSURE: 70 MMHG

## 2025-03-24 DIAGNOSIS — Z12.11 ENCOUNTER FOR SCREENING FOR MALIGNANT NEOPLASM OF COLON: ICD-10-CM

## 2025-03-24 DIAGNOSIS — K21.9 GASTRO-ESOPHAGEAL REFLUX DISEASE W/OUT ESOPHAGITIS: ICD-10-CM

## 2025-03-24 PROCEDURE — 99204 OFFICE O/P NEW MOD 45 MIN: CPT

## 2025-03-24 RX ORDER — SEMAGLUTIDE 1.7 MG/.75ML
INJECTION, SOLUTION SUBCUTANEOUS
Refills: 0 | Status: ACTIVE | COMMUNITY

## 2025-03-24 RX ORDER — SODIUM SULFATE, POTASSIUM SULFATE AND MAGNESIUM SULFATE 1.6; 3.13; 17.5 G/177ML; G/177ML; G/177ML
17.5-3.13-1.6 SOLUTION ORAL
Qty: 1 | Refills: 0 | Status: ACTIVE | COMMUNITY
Start: 2025-03-24 | End: 1900-01-01

## 2025-04-28 ENCOUNTER — RESULT REVIEW (OUTPATIENT)
Age: 45
End: 2025-04-28

## 2025-04-29 ENCOUNTER — APPOINTMENT (OUTPATIENT)
Dept: PODIATRY | Facility: CLINIC | Age: 45
End: 2025-04-29
Payer: COMMERCIAL

## 2025-04-29 DIAGNOSIS — S99.922A UNSPECIFIED INJURY OF LEFT FOOT, INITIAL ENCOUNTER: ICD-10-CM

## 2025-04-29 PROCEDURE — 99203 OFFICE O/P NEW LOW 30 MIN: CPT

## 2025-04-29 RX ORDER — ALPRAZOLAM 2 MG/1
TABLET ORAL
Refills: 0 | Status: ACTIVE | COMMUNITY

## 2025-04-29 RX ORDER — ELECTROLYTES/DEXTROSE
SOLUTION, ORAL ORAL
Refills: 0 | Status: ACTIVE | COMMUNITY

## 2025-05-01 ENCOUNTER — RESULT REVIEW (OUTPATIENT)
Age: 45
End: 2025-05-01

## 2025-05-01 ENCOUNTER — APPOINTMENT (OUTPATIENT)
Dept: PODIATRY | Facility: CLINIC | Age: 45
End: 2025-05-01
Payer: COMMERCIAL

## 2025-05-01 DIAGNOSIS — T79.2XXA TRAUMATIC SECONDARY AND RECURRENT HEMORRHAGE AND SEROMA, INITIAL ENCOUNTER: ICD-10-CM

## 2025-05-01 DIAGNOSIS — S90.32XA CONTUSION OF LEFT FOOT, INITIAL ENCOUNTER: ICD-10-CM

## 2025-05-01 PROCEDURE — 10140 I&D HMTMA SEROMA/FLUID COLLJ: CPT

## 2025-05-01 PROCEDURE — 99213 OFFICE O/P EST LOW 20 MIN: CPT | Mod: 25

## 2025-05-07 ENCOUNTER — APPOINTMENT (OUTPATIENT)
Dept: OBGYN | Facility: CLINIC | Age: 45
End: 2025-05-07
Payer: COMMERCIAL

## 2025-05-07 VITALS
SYSTOLIC BLOOD PRESSURE: 120 MMHG | DIASTOLIC BLOOD PRESSURE: 70 MMHG | BODY MASS INDEX: 24.75 KG/M2 | HEIGHT: 64 IN | WEIGHT: 145 LBS

## 2025-05-07 DIAGNOSIS — Z01.419 ENCOUNTER FOR GYNECOLOGICAL EXAMINATION (GENERAL) (ROUTINE) W/OUT ABNORMAL FINDINGS: ICD-10-CM

## 2025-05-07 DIAGNOSIS — Z12.4 ENCOUNTER FOR SCREENING FOR MALIGNANT NEOPLASM OF CERVIX: ICD-10-CM

## 2025-05-07 DIAGNOSIS — Z11.51 ENCOUNTER FOR SCREENING FOR HUMAN PAPILLOMAVIRUS (HPV): ICD-10-CM

## 2025-05-07 DIAGNOSIS — Z11.3 ENCOUNTER FOR SCREENING FOR INFECTIONS WITH A PREDOMINANTLY SEXUAL MODE OF TRANSMISSION: ICD-10-CM

## 2025-05-07 DIAGNOSIS — Z12.11 ENCOUNTER FOR SCREENING FOR MALIGNANT NEOPLASM OF COLON: ICD-10-CM

## 2025-05-07 PROCEDURE — 36415 COLL VENOUS BLD VENIPUNCTURE: CPT

## 2025-05-07 PROCEDURE — 99396 PREV VISIT EST AGE 40-64: CPT

## 2025-05-08 LAB
C TRACH RRNA SPEC QL NAA+PROBE: NOT DETECTED
C TRACH RRNA SPEC QL NAA+PROBE: NOT DETECTED
HBV SURFACE AG SER QL: NONREACTIVE
HCV AB SER QL: NONREACTIVE
HCV S/CO RATIO: 0.16 S/CO
HIV1+2 AB SPEC QL IA.RAPID: NONREACTIVE
HPV HIGH+LOW RISK DNA PNL CVX: NOT DETECTED
N GONORRHOEA RRNA SPEC QL NAA+PROBE: NOT DETECTED
N GONORRHOEA RRNA SPEC QL NAA+PROBE: NOT DETECTED
SOURCE TP AMPLIFICATION: NORMAL
SOURCE TP AMPLIFICATION: NORMAL
T PALLIDUM AB SER QL IA: NEGATIVE
T VAGINALIS RRNA SPEC QL NAA+PROBE: NOT DETECTED

## 2025-05-12 ENCOUNTER — APPOINTMENT (OUTPATIENT)
Dept: PODIATRY | Facility: CLINIC | Age: 45
End: 2025-05-12
Payer: COMMERCIAL

## 2025-05-12 DIAGNOSIS — T79.2XXA TRAUMATIC SECONDARY AND RECURRENT HEMORRHAGE AND SEROMA, INITIAL ENCOUNTER: ICD-10-CM

## 2025-05-12 LAB
HSV 1+2 IGG SER IA-IMP: NEGATIVE
HSV 1+2 IGG SER IA-IMP: NEGATIVE
HSV1 IGG SER QL: 0.11 INDEX
HSV2 IGG SER QL: 0.06 INDEX
T PALLIDUM AB SER QL IA: NEGATIVE

## 2025-05-12 PROCEDURE — 99213 OFFICE O/P EST LOW 20 MIN: CPT

## 2025-05-13 LAB — CYTOLOGY CVX/VAG DOC THIN PREP: NORMAL

## 2025-05-28 ENCOUNTER — APPOINTMENT (OUTPATIENT)
Dept: GASTROENTEROLOGY | Facility: HOSPITAL | Age: 45
End: 2025-05-28

## 2025-05-28 ENCOUNTER — RESULT REVIEW (OUTPATIENT)
Age: 45
End: 2025-05-28

## 2025-08-07 ENCOUNTER — APPOINTMENT (OUTPATIENT)
Dept: PSYCHIATRY | Facility: CLINIC | Age: 45
End: 2025-08-07
Payer: COMMERCIAL

## 2025-08-07 PROCEDURE — 90791 PSYCH DIAGNOSTIC EVALUATION: CPT | Mod: 95
